# Patient Record
Sex: FEMALE | Race: WHITE | Employment: FULL TIME | URBAN - METROPOLITAN AREA
[De-identification: names, ages, dates, MRNs, and addresses within clinical notes are randomized per-mention and may not be internally consistent; named-entity substitution may affect disease eponyms.]

---

## 2020-09-18 ENCOUNTER — APPOINTMENT (OUTPATIENT)
Dept: GENERAL RADIOLOGY | Age: 25
End: 2020-09-18
Payer: COMMERCIAL

## 2020-09-18 ENCOUNTER — HOSPITAL ENCOUNTER (EMERGENCY)
Age: 25
Discharge: HOME | End: 2020-09-18
Attending: EMERGENCY MEDICINE
Payer: COMMERCIAL

## 2020-09-18 VITALS
HEIGHT: 65 IN | TEMPERATURE: 98.3 F | HEART RATE: 136 BPM | OXYGEN SATURATION: 96 % | DIASTOLIC BLOOD PRESSURE: 93 MMHG | BODY MASS INDEX: 37.52 KG/M2 | WEIGHT: 225.2 LBS | RESPIRATION RATE: 18 BRPM | SYSTOLIC BLOOD PRESSURE: 155 MMHG

## 2020-09-18 PROCEDURE — 73130 X-RAY EXAM OF HAND: CPT

## 2020-09-18 PROCEDURE — 6370000000 HC RX 637 (ALT 250 FOR IP): Performed by: EMERGENCY MEDICINE

## 2020-09-18 PROCEDURE — 99283 EMERGENCY DEPT VISIT LOW MDM: CPT

## 2020-09-18 RX ORDER — GINSENG 100 MG
CAPSULE ORAL
Status: DISCONTINUED
Start: 2020-09-18 | End: 2020-09-18 | Stop reason: HOSPADM

## 2020-09-18 RX ORDER — AMOXICILLIN AND CLAVULANATE POTASSIUM 875; 125 MG/1; MG/1
1 TABLET, FILM COATED ORAL 2 TIMES DAILY
Qty: 20 TABLET | Refills: 0 | Status: SHIPPED | OUTPATIENT
Start: 2020-09-18 | End: 2020-09-28

## 2020-09-18 RX ORDER — AMOXICILLIN AND CLAVULANATE POTASSIUM 875; 125 MG/1; MG/1
1 TABLET, FILM COATED ORAL ONCE
Status: COMPLETED | OUTPATIENT
Start: 2020-09-18 | End: 2020-09-18

## 2020-09-18 RX ADMIN — AMOXICILLIN AND CLAVULANATE POTASSIUM 1 TABLET: 875; 125 TABLET, FILM COATED ORAL at 15:05

## 2020-09-18 ASSESSMENT — PAIN DESCRIPTION - ORIENTATION: ORIENTATION: LEFT

## 2020-09-18 ASSESSMENT — PAIN DESCRIPTION - LOCATION: LOCATION: HAND

## 2020-09-18 ASSESSMENT — PAIN SCALES - GENERAL: PAINLEVEL_OUTOF10: 2

## 2020-09-18 NOTE — ED NOTES
Irrigated dog bites on left hand with 250ml Saline and Hibiclens. Pt. Tolerated well.       Shannon John, NADEGE  09/18/20 7504

## 2020-09-18 NOTE — ED NOTES
Bacitracin placed to wounds on left hand. Covered with bandaids. Patient verbalized understanding of d/c instructions. Patient given scripts x 1. Patient left the ED and instructed on follow up.         Bryan Santacruz RN  09/18/20 [id number]

## 2020-09-18 NOTE — ED PROVIDER NOTES
eMERGENCY dEPARTMENT eNCOUnter      PtName: Cachorro Shen  MRN: [id number]  Birthdate 1995  Date of evaluation: 9/18/2020  Provider: Franklin Kilpatrick DO     CHIEF COMPLAINT       Chief Complaint   Patient presents with    Animal Bite         HISTORY OF PRESENT ILLNESS   (Location/Symptom, Timing/Onset,Context/Setting, Quality, Duration, Modifying Factors, Severity) Note limiting factors. HPI    Cachorro Shen is a 31 y.o. female who presents to the emergency department with chief complaint of scratches on her left wrist and laceration to her left palm. Reports that she was trying to break up a dog and a cat fight. Tetanus she thinks is up-to-date but she is requesting that she call her PCP next week. She denies any current pain or numbness or weakness. Occurred just prior to arrival.  Right-handed. Nursing Notes were reviewed. REVIEW OF SYSTEMS    (2+ forlevel 4; 10+ for level 5)     Review of Systems  See hpi for further details. Complete 10 point review of all systems performed and is otherwise negative unless noted above. PAST MEDICAL HISTORY     Past Medical History:   Diagnosis Date    Hyperlipidemia     Hypertension        SURGICAL HISTORY     History reviewed. No pertinent surgical history. CURRENT MEDICATIONS       Previous Medications    ATORVASTATIN CALCIUM PO    Take by mouth    LISINOPRIL PO    Take by mouth       ALLERGIES     Patient has no known allergies. FAMILY HISTORY     History reviewed. No pertinent family history.        SOCIAL HISTORY       Social History     Socioeconomic History    Marital status: Single     Spouse name: None    Number of children: None    Years of education: None    Highest education level: None   Occupational History    None   Social Needs    Financial resource strain: None    Food insecurity     Worry: None     Inability: None    Transportation needs     Medical: None     Non-medical: None   Tobacco Use    Smoking status: Never Smoker    Smokeless tobacco: Never Used   Substance and Sexual Activity    Alcohol use: Yes     Comment: occasional    Drug use: Never    Sexual activity: None   Lifestyle    Physical activity     Days per week: None     Minutes per session: None    Stress: None   Relationships    Social connections     Talks on phone: None     Gets together: None     Attends [employer] service: None     Active member of club or organization: None     Attends meetings of clubs or organizations: None     Relationship status: None    Intimate partner violence     Fear of current or ex partner: None     Emotionally abused: None     Physically abused: None     Forced sexual activity: None   Other Topics Concern    None   Social History Narrative    None       SCREENINGS           PHYSICAL EXAM    (5+ for level 4, 8+ for level 5)     ED Triage Vitals [09/18/20 1409]   BP Temp Temp Source Pulse Resp SpO2 Height Weight   (!) 155/93 98.3 °F (36.8 °C) Oral 136 18 96 % 5' 5\" (1.651 m) 225 lb 3.2 oz (102.2 kg)       Physical Exam  Vitals signs and nursing note reviewed. Constitutional:       General: She is not in acute distress. Appearance: Normal appearance. She is not toxic-appearing or diaphoretic. HENT:      Head: Normocephalic and atraumatic. Right Ear: External ear normal.      Left Ear: External ear normal.      Nose: Nose normal.      Mouth/Throat:      Mouth: Mucous membranes are moist.      Pharynx: Oropharynx is clear. Eyes:      General: No scleral icterus. Right eye: No discharge. Left eye: No discharge. Extraocular Movements: Extraocular movements intact. Conjunctiva/sclera: Conjunctivae normal.      Pupils: Pupils are equal, round, and reactive to light. Neck:      Musculoskeletal: Normal range of motion and neck supple. Cardiovascular:      Pulses: Normal pulses. Pulmonary:      Effort: Pulmonary effort is normal. No respiratory distress. Breath sounds: Normal breath sounds.  No stridor. Abdominal:      General: Abdomen is flat. There is no distension. Musculoskeletal: Normal range of motion. General: No swelling, tenderness, deformity or signs of injury. Right lower leg: No edema. Left lower leg: No edema. Comments: Scratches noted on left wrist.  3 cm laceration along the ulnar volar aspect of the left palm. Muscle strength testing of the fingers tested in abduction, abduction, flexion and extension against resistance is 5/5 without pain. Neurovascular intact distally. Skin:     General: Skin is warm and dry. Capillary Refill: Capillary refill takes less than 2 seconds. Coloration: Skin is not jaundiced or pale. Findings: No bruising, erythema, lesion or rash. Neurological:      General: No focal deficit present. Mental Status: She is alert and oriented to person, place, and time. Cranial Nerves: No cranial nerve deficit. Sensory: No sensory deficit. Motor: No weakness. Psychiatric:         Mood and Affect: Mood normal.         Behavior: Behavior normal.         Thought Content: Thought content normal.         Judgment: Judgment normal.             DIAGNOSTIC RESULTS       RADIOLOGY (Per Emergency Physician): Interpretation per the Radiologist below, if available at the time of this note:  Xr Hand Left (min 3 Views)    Result Date: 9/18/2020  History: Animal bite. Evaluate for foreign body or fracture. 3 views left hand FINDINGS: Normal mineralization. No acute fracture or subluxation. No joint effusion. No radiopaque foreign body or pathologic soft tissue calcification. 1. No acute osseous abnormality. Derril Muna LABS:  Labs Reviewed - No data to display    All other labs were within normal range or not returned as of this dictation.     EMERGENCY DEPARTMENT COURSE and DIFFERENTIAL DIAGNOSIS/MDM:   Vitals:    Vitals:    09/18/20 1409   BP: (!) 155/93   Pulse: 136   Resp: 18   Temp: 98.3 °F (36.8 °C)   TempSrc: Oral SpO2: 96%   Weight: 225 lb 3.2 oz (102.2 kg)   Height: 5' 5\" (1.651 m)       Medications   amoxicillin-clavulanate (AUGMENTIN) 875-125 MG per tablet 1 tablet (1 tablet Oral Given 9/18/20 1505)       MDM. X-ray ordered to rule out foreign body. Patient to be given antibiotics. She would like to check with her PCP early next week regarding tetanus status. Her cat is utd on vaccinations, the dog is a neighbor dog and appears well cared for according to pt but she does not know its vaccination status. Wound was irrigated by nursing. Wound to be dressed. X-ray is negative for foreign body or fracture. Patient will be discharged home with strict return precautions and the below medications. She is to check with her PCP regarding her tetanus status. If greater than 5 years since last tetanus recommend repeat tetanus. Patient instructed to follow up with their primary care doctor in one-two days and return to the emergency department if worsening of the condition or any other concerns. Please see discharge instructions for further delineation regarding the specific discharge instructions explained and given to the patient. CONSULTS:  None    PROCEDURES:  Unless otherwise noted below, none     Procedures    FINAL IMPRESSION      1. Bite by animal    2.  Laceration of left hand without foreign body, initial encounter          DISPOSITION/PLAN   DISPOSITION        PATIENT REFERRED TO:  Leonora Boyd    Schedule an appointment as soon as possible for a visit       [address] Emergency Department  [facility] [place]  [phone]    If symptoms worsen      DISCHARGE MEDICATIONS:  New Prescriptions    AMOXICILLIN-CLAVULANATE (AUGMENTIN) 875-125 MG PER TABLET    Take 1 tablet by mouth 2 times daily for 10 days          (Please note:  Portions of this note were completed with a voice recognition program. Efforts were made to edit the dictations but occasionally words and phrases are mis-transcribed.)    Form v2016. J.[id number]    Armando Yo DO (electronically signed)  Emergency Medicine Provider              Bobby Araiza,   09/18/20 [address] Spivey,   09/18/20 1524

## 2021-04-02 ENCOUNTER — IMMUNIZATION (OUTPATIENT)
Dept: PRIMARY CARE CLINIC | Age: 26
End: 2021-04-02
Payer: COMMERCIAL

## 2021-04-02 PROCEDURE — 91301 COVID-19, MODERNA VACCINE 100MCG/0.5ML DOSE: CPT | Performed by: FAMILY MEDICINE

## 2021-04-02 PROCEDURE — 0011A COVID-19, MODERNA VACCINE 100MCG/0.5ML DOSE: CPT | Performed by: FAMILY MEDICINE

## 2021-04-30 ENCOUNTER — IMMUNIZATION (OUTPATIENT)
Dept: PRIMARY CARE CLINIC | Age: 26
End: 2021-04-30
Payer: COMMERCIAL

## 2021-04-30 PROCEDURE — 0012A COVID-19, MODERNA VACCINE 100MCG/0.5ML DOSE: CPT | Performed by: FAMILY MEDICINE

## 2021-04-30 PROCEDURE — 91301 COVID-19, MODERNA VACCINE 100MCG/0.5ML DOSE: CPT | Performed by: FAMILY MEDICINE
